# Patient Record
Sex: FEMALE | Race: OTHER | NOT HISPANIC OR LATINO | URBAN - METROPOLITAN AREA
[De-identification: names, ages, dates, MRNs, and addresses within clinical notes are randomized per-mention and may not be internally consistent; named-entity substitution may affect disease eponyms.]

---

## 2019-12-12 PROBLEM — 235595009: Status: ACTIVE | Noted: 2019-12-11

## 2020-06-09 ENCOUNTER — OFFICE VISIT (OUTPATIENT)
Dept: URBAN - METROPOLITAN AREA MEDICAL CENTER 10 | Facility: MEDICAL CENTER | Age: 45
End: 2020-06-09

## 2020-06-24 ENCOUNTER — DASHBOARD ENCOUNTERS (OUTPATIENT)
Age: 45
End: 2020-06-24

## 2020-06-24 ENCOUNTER — OFFICE VISIT (OUTPATIENT)
Dept: URBAN - METROPOLITAN AREA CLINIC 35 | Facility: CLINIC | Age: 45
End: 2020-06-24

## 2020-06-24 VITALS — HEIGHT: 67 IN | WEIGHT: 178 LBS | BODY MASS INDEX: 27.94 KG/M2

## 2020-06-24 PROBLEM — 84568007: Status: ACTIVE | Noted: 2019-12-27

## 2020-06-24 RX ORDER — CYANOCOBALAMIN 500 UG/1
1 SPRAY IN ONE NOSTRIL SPRAY NASAL
Qty: 1 BOTTLE | Refills: 2 | Status: ACTIVE | COMMUNITY
Start: 2020-01-24

## 2020-06-24 RX ORDER — PANTOPRAZOLE SODIUM 40 MG/1
TAKE 1 TABLET BY MOUTH EVERY DAY TABLET, DELAYED RELEASE ORAL
Qty: 30 UNSPECIFIED | Refills: 0 | Status: ON HOLD | COMMUNITY

## 2020-06-24 RX ORDER — LEVOTHYROXINE SODIUM 88 UG/1
1 TABLET IN THE MORNING ON AN EMPTY STOMACH TABLET ORAL ONCE A DAY
Qty: 30 | Status: ACTIVE | COMMUNITY
Start: 2019-12-11

## 2020-06-24 NOTE — HPI-MIGRATED HPI
;   ;   ;     Gastritis : Patient has Autoimmune Atrophic Gastritis.  She is on both Vit B 12 and iron supplementation. She continues to take Nascobal weekly. She also admits improvement of digesting food. She admits some trouble still with red meat.   She is also taking her OTC Iron supplement.  Today she has no new GI complains/issues.  Last visit (4/28/2020) Patient presents today for follow up of Autoimmune Atrophic Gastritis via Tele health visit and consent has been obtained.   She continues to take Nascobal weekly.  Patient admits improvement of digesting food. She admits decreasing her intake of dairy, and broccoli with improvement in bloating.   She is also taking her OTC Iron supplement.  Today she has no new GI complains/issues.  Recent labs as documented below.  Last visit (1/22/2020)  Patient presents today for follow up of Atrophic gastritis and to review lab results as documented below.   Patient admits lower back pain, she admits some relief after BM.   Blood works supports the diagnosis of Autoimmune atrophic gastritis. Patient found to be iron deficient as well. Vit B 12 was ok.  Patient is complaining of the sensation she is not able to digest food well; feels full.;   Heartburn : Patient presents today for follow up to her EGD. Patient has known Autoimmune Atrophic gastritis. Since the procedure patient denies dysphagia, globus, changes in appetite, and changes in bowel habits.  She denies heartburn at this time.   EGD and path report as documented below and discussed with patient.  Last visit  Patient admits heartburn is under control with lifestyle modifications and managing her diet.    Last visit (12/27/2019) Patient presents today for follow up to her EGD. Since the procedure patient denies dysphagia, globus, changes in appetite, and changes in bowel habits.  EGD and path report as documented below. Less than 5 mm polyp removed form body of stomach. 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 Flattening of gastric folds and path came back with features c/w autoimmune gastritis with associated intestinal metaplasia and moderate atrophy.  Currently patient is off Pantoprazole and managing her reflux with lifestyle modifications and she is being successful. Still has an uncomfortable sensation in throat. Regarding chronic constipation, patient tried the high fiber diet with no significant response.   Previously she had been doing Fleets suppositories daily. She tried Miralax in the past and made her bloated. Patient is ready to try something else.  Last visit (12/11/2019) 44 year old female presents today for consultation of heartburn. Patient admits a longstanding hx of heartburn. Her symptoms includes epigastric pain, left side sharp pain that radiates to back, globus sensation, and eructations. Symptoms occur prior to meals, and following meals.   Symptoms started after she choked on a piece of meat. Since then reflux symptoms started. For the last 2 months she is having regurgitation, tightness in chest. CP in Thanksgiving. Patient went to ER at [facility] and she a normal stress test.   She is currently taking Pantoprazole 40 mg with some improvement but still 2-3 times a week she has sharp left sided chest pain.  Patient has stopped coffee, increased vegetables in diet, decreased seasoning/spices, stopped tomatoes, chocolate and overall feels better.  Patient admits a Barium Swallow with [facility] 3 months ago. She reports normal results.  Patient denies previous EGD   She has chronic constipation. Patient uses Fleets suppositories daily and has a BM with it.  Miralax did not agree with her in the past. No melena or visible blood in stool.;   Constipation : Patient admits 1 BM per day with soft stool. She denies melena, blood, or mucus.   Last visit Patient admits 1 BM per day, with soft stool. Patient denies melena, blood, or mucus in stool.   She admits increasing her fluid intake, taking a probiotic, and olive oil to her salad.  Last visit (1/22/2020) Patient admits improvement of constipation with eating more vegetables and increasing her fluid intake.   She admits 1 BM per day, with soft stool. Patient denies melena, blood, or mucus in stool.   Patient has previously tried Linzess 145 and 290 without improvement. She has also tried Trulance and she admits explosive BMs, lower abdominal pressure. Currently she is not taking any meds and is managing her constipation with diet successfully. ;

## 2020-10-05 ENCOUNTER — LAB OUTSIDE AN ENCOUNTER (OUTPATIENT)
Dept: URBAN - METROPOLITAN AREA CLINIC 35 | Facility: CLINIC | Age: 45
End: 2020-10-05

## 2020-10-21 ENCOUNTER — OFFICE VISIT (OUTPATIENT)
Dept: URBAN - METROPOLITAN AREA CLINIC 33 | Facility: CLINIC | Age: 45
End: 2020-10-21